# Patient Record
Sex: FEMALE | URBAN - METROPOLITAN AREA
[De-identification: names, ages, dates, MRNs, and addresses within clinical notes are randomized per-mention and may not be internally consistent; named-entity substitution may affect disease eponyms.]

---

## 2020-10-05 ENCOUNTER — APPOINTMENT (RX ONLY)
Dept: URBAN - METROPOLITAN AREA CLINIC 43 | Facility: CLINIC | Age: 45
Setting detail: DERMATOLOGY
End: 2020-10-05

## 2020-10-05 VITALS — TEMPERATURE: 98.2 F

## 2020-10-05 DIAGNOSIS — L20.89 OTHER ATOPIC DERMATITIS: ICD-10-CM

## 2020-10-05 PROBLEM — L20.84 INTRINSIC (ALLERGIC) ECZEMA: Status: ACTIVE | Noted: 2020-10-05

## 2020-10-05 PROCEDURE — ? COUNSELING

## 2020-10-05 PROCEDURE — ? TREATMENT REGIMEN

## 2020-10-05 PROCEDURE — ? PRESCRIPTION

## 2020-10-05 PROCEDURE — 99202 OFFICE O/P NEW SF 15 MIN: CPT

## 2020-10-05 RX ORDER — CLOBETASOL PROPIONATE 0.5 MG/G
1GM OINTMENT TOPICAL BID
Qty: 1 | Refills: 1 | Status: ERX | COMMUNITY
Start: 2020-10-05

## 2020-10-05 RX ADMIN — CLOBETASOL PROPIONATE 1GM: 0.5 OINTMENT TOPICAL at 00:00

## 2020-10-05 ASSESSMENT — LOCATION SIMPLE DESCRIPTION DERM
LOCATION SIMPLE: LEFT FOOT
LOCATION SIMPLE: RIGHT FOOT

## 2020-10-05 ASSESSMENT — LOCATION DETAILED DESCRIPTION DERM
LOCATION DETAILED: RIGHT DORSAL FOOT
LOCATION DETAILED: LEFT DORSAL FOOT

## 2020-10-05 ASSESSMENT — LOCATION ZONE DERM: LOCATION ZONE: FEET

## 2020-10-05 NOTE — PROCEDURE: MIPS QUALITY
Additional Notes: COVID-19 screening questions \\nPlan: MIPS Quality.\\nJaiting for pending test results for COVID19? No\\nHave you been in close contact with anyone who has COVID19? No\\nDo you have symptoms including fever, cough, difficulty breathing, muscle aches/soreness, sore throat, or\\ndiarrhea? No\\nHave you traveled outside the [place] or to any of the following states ([place], [place], [place], [place], [place], [place]) in the last 14 days?\\nNo\\nIs there any reason you cannot wear a face mask that covers your nose and mouth for your entire visit? No
Quality 110: Preventive Care And Screening: Influenza Immunization: Influenza Immunization not Administered because Patient Refused.
Detail Level: Detailed

## 2020-10-05 NOTE — PROCEDURE: TREATMENT REGIMEN
Show Vanicream Line: Yes
Sig For Treatment 1 (If Needed): twice daily
Detail Level: Zone
Action 1: Start
Action 3: Continue
Treatment 1: Clobetasol ointment

## 2020-10-07 ENCOUNTER — RX ONLY (OUTPATIENT)
Age: 45
Setting detail: RX ONLY
End: 2020-10-07

## 2020-10-07 RX ORDER — CLOBETASOL PROPIONATE 0.5 MG/G
1GM OINTMENT TOPICAL BID
Qty: 1 | Refills: 1 | Status: ERX

## 2021-04-16 NOTE — HPI: RASH
Conjuntivae and eyelids appear normal , Sclerae : White without injection, no icterus.
What Type Of Note Output Would You Prefer (Optional)?: Standard Output
How Severe Is Your Rash?: mild
Is This A New Presentation, Or A Follow-Up?: Rash
Additional History: Pt has sarcoidosis, says also had eczema in childhood